# Patient Record
Sex: FEMALE | Race: WHITE
[De-identification: names, ages, dates, MRNs, and addresses within clinical notes are randomized per-mention and may not be internally consistent; named-entity substitution may affect disease eponyms.]

---

## 2017-04-18 ENCOUNTER — HOSPITAL ENCOUNTER (OUTPATIENT)
Dept: HOSPITAL 44 - LAB | Age: 57
End: 2017-04-18
Attending: PHYSICIAN ASSISTANT
Payer: COMMERCIAL

## 2017-04-18 DIAGNOSIS — R10.84: Primary | ICD-10-CM

## 2017-04-18 LAB
BASOPHILS NFR BLD: 0.4 % (ref 0–1.5)
EGFR (AFRICAN): > 60
EGFR (NON-AFRICAN): > 60
EOSINOPHIL NFR BLD: 1.3 % (ref 0–6.8)
LIPASE SERPL-CCNC: 53 U/L (ref 13–60)
MCH RBC QN AUTO: 28.9 PG (ref 28–34)
MCV RBC AUTO: 90 FL (ref 80–100)
MONOCYTES %: 3.4 % (ref 0–11)
NEUTROPHILS #: 4.4 # K/UL (ref 1.4–7.7)

## 2017-04-18 PROCEDURE — 83690 ASSAY OF LIPASE: CPT

## 2017-04-18 PROCEDURE — 85025 COMPLETE CBC W/AUTO DIFF WBC: CPT

## 2017-04-18 PROCEDURE — 74020: CPT

## 2017-04-18 PROCEDURE — 80053 COMPREHEN METABOLIC PANEL: CPT

## 2017-04-18 PROCEDURE — 36415 COLL VENOUS BLD VENIPUNCTURE: CPT

## 2017-04-18 NOTE — DIAGNOSTIC IMAGING REPORT
MICHAEL RAJAN 

[facility]

[address] [facility].[address] [address]

 

 

 

 

Report Submission Date: 2017 10:47:11 AM CDT

Patient       Study

Name:   SYLWIA DIOP       Date:   2017 9:43:16 AM CDT

MRN:   [id number]       Modality Type:   CR

Gender:   F       Description:   ABDOMEN

:   64       Institution:   [facility]

Physician:   MICHAEL RAJAN

         

 

 

Obstructive series - two views 

Clinical history:  Abdominal pain and tenderness. 

Findings:  Examination of the abdomen in supine and upright views demonstrates 
gas and stool throughout the colon.  There are degenerative changes in the 
visualized thoracolumbar spine.  Lung bases are clear.  There are no unusual 
intra-abdominal calcifications.  Visualized visceral silhouettes are within 
normal limits. Bilateral total hip replacements are incidentally noted.  There 
is no evidence of obstruction or free air. 

Impression: 

1.  Thoracolumbar spondylosis. 

2.  Otherwise negative study.

 

Electronically signed on 2017 10:47:11 AM CDT by:

Raheel PFEIFFER

## 2018-09-07 ENCOUNTER — HOSPITAL ENCOUNTER (OUTPATIENT)
Dept: HOSPITAL 44 - LABRHC | Age: 58
End: 2018-09-07
Attending: PHYSICIAN ASSISTANT
Payer: COMMERCIAL

## 2018-09-07 DIAGNOSIS — Z12.4: Primary | ICD-10-CM

## 2018-09-07 PROCEDURE — G0143 SCR C/V CYTO,THINLAYER,RESCR: HCPCS

## 2018-09-07 PROCEDURE — 88148 CYTOPATH C/V AUTO RESCREEN: CPT
